# Patient Record
Sex: MALE | Race: WHITE
[De-identification: names, ages, dates, MRNs, and addresses within clinical notes are randomized per-mention and may not be internally consistent; named-entity substitution may affect disease eponyms.]

---

## 2020-03-24 ENCOUNTER — HOSPITAL ENCOUNTER (OUTPATIENT)
Dept: HOSPITAL 7 - FB.SDS | Age: 45
Discharge: HOME | End: 2020-03-24
Payer: COMMERCIAL

## 2020-03-24 DIAGNOSIS — Z79.899: ICD-10-CM

## 2020-03-24 DIAGNOSIS — Z80.0: ICD-10-CM

## 2020-03-24 DIAGNOSIS — K92.1: ICD-10-CM

## 2020-03-24 DIAGNOSIS — K21.9: ICD-10-CM

## 2020-03-24 DIAGNOSIS — K22.70: ICD-10-CM

## 2020-03-24 DIAGNOSIS — Z90.49: ICD-10-CM

## 2020-03-24 DIAGNOSIS — I10: ICD-10-CM

## 2020-03-24 DIAGNOSIS — K64.9: Primary | ICD-10-CM

## 2020-03-24 PROCEDURE — 45378 DIAGNOSTIC COLONOSCOPY: CPT

## 2020-03-24 PROCEDURE — 88313 SPECIAL STAINS GROUP 2: CPT

## 2020-03-24 PROCEDURE — 43239 EGD BIOPSY SINGLE/MULTIPLE: CPT

## 2020-03-24 PROCEDURE — 88305 TISSUE EXAM BY PATHOLOGIST: CPT

## 2020-03-24 NOTE — PCM.HPR
H & P Addendum review





- H & P Addendum Review


Date of Original H & P: 03/05/20


Date Reviewed: 03/24/20


Time Reviewed: 08:05


Patient was Examined: No Changes

## 2020-03-24 NOTE — PCM.OPNOTE
- General Post-Op/Procedure Note


Date of Surgery/Procedure: 03/24/20


Operative Procedure(s): EGD with Bx.  Colonoscopy


Findings: 





Mild Esephagitis


Normal Cscope


Pre Op Diagnosis: GERD; Rectal bleeding


Post-Op Diagnosis: Same


Anesthesia Technique: MAC


Primary Surgeon: Thomas J Mohs


Anesthesia Provider: Lore Lainez


Complications: None


Condition: Good

## 2020-03-25 NOTE — OR
DATE OF OPERATION:  03/24/2020

 

SURGEON:  Thomas J Mohs, MD

 

PREOPERATIVE DIAGNOSES:

1. Gastroesophageal reflux disease.

2. Hematochezia.

 

POSTOPERATIVE DIAGNOSES:

1. Gastroesophageal reflux disease.

2. Normal colonoscopy.

 

PROCEDURES:

1. Esophagogastroduodenoscopy with biopsy.

2. Colonoscopy.

 

ANESTHESIA:  IV sedation.

 

DESCRIPTION OF PROCEDURE:  The patient was brought to the procedure room, where

he was placed on his left side and IV sedation administered.  Oral bite block

was placed and the upper endoscope advanced into the esophagus under direct

vision without difficulty.  Vocal cords were viewed and were normal.  The scope

was advanced to the third portion of the duodenum.  The duodenum and pylorus

were normal.  Antrum and body of the stomach were normal.  Retroflexion reveals

a normal-appearing fundus.  There was no hiatal hernia.  The squamocolumnar

junction was slightly irregular with possibly one short segment of New's

esophagus.  I did biopsy this area.  I did not see any inflammation, strictures,

erosions, or other abnormalities.  Air was removed and the scope withdrawn

through the remaining esophagus, which appeared normal.  The patient tolerated

this portion of the procedure well.

 

Next, colonoscopy was performed, after digital rectal exam was done, which was

normal.  The colonoscope was inserted and advanced to the level of the cecum

without difficulty.  Cecal position was confirmed by identifying the appendiceal

lumen and ileocecal valve.  Prep was good and surfaces were well visualized.

Upon withdrawing the scope, the ascending, transverse, and descending colon were

normal in appearance.  The sigmoid colon and rectum were normal.  Retroflexion

does reveal some mildly enlarged hemorrhoidal columns, but no inflammation or

evidence of recent bleeding.  Air was removed and the scope withdrawn.  The

patient tolerated the procedure well and returned to Recovery in stable

condition.

 

RECOMMENDATIONS:  I will contact the patient with the results of his upper

endoscopy when it returns.  He should remain on his current medication.  If

New's is present, he should undergo a repeat upper endoscopy again in 3

years.  If he has recurrent hematochezia, he should see me in the clinic at the

time of the bleeding, where anoscopic exam could be performed and possible

hemorrhoid banding, if a source is identified.

 

Job#: [id number]

DD: 03/24/2020 0925

DT: 03/24/2020 1014 ERIKA/CHEPE

## 2020-10-20 ENCOUNTER — HOSPITAL ENCOUNTER (OUTPATIENT)
Dept: HOSPITAL 7 - FB.SDS | Age: 45
Discharge: HOME | End: 2020-10-20
Payer: COMMERCIAL

## 2020-10-20 DIAGNOSIS — K64.8: Primary | ICD-10-CM

## 2020-10-20 DIAGNOSIS — K64.4: ICD-10-CM

## 2020-10-20 DIAGNOSIS — K92.1: ICD-10-CM

## 2020-10-20 DIAGNOSIS — I10: ICD-10-CM

## 2020-10-20 DIAGNOSIS — D64.9: ICD-10-CM

## 2020-10-20 DIAGNOSIS — D17.1: ICD-10-CM

## 2020-10-20 NOTE — PCM.HPR
H & P Addendum review





- H & P Addendum Review


Date of Original H & P: 10/16/20


Date Reviewed: 10/20/20


Time Reviewed: 08:30


Patient was Examined: No Changes (Fells better after transfusion last week)

## 2020-10-20 NOTE — PCM.OPNOTE
- General Post-Op/Procedure Note


Date of Surgery/Procedure: 10/20/20


Operative Procedure(s): Colonoscopy.  Hemorrhoidectomy.  Skin tag removal


Pre Op Diagnosis: Hematochezia.  Skin tag


Post-Op Diagnosis: Same


Anesthesia Technique: Local, MAC


Primary Surgeon: Thomas J Mohs


EBL in mLs: 20


Complications: None


Condition: Good

## 2020-10-21 NOTE — OR
DATE OF OPERATION:  10/20/2020

 

SURGEON:  Thomas J Mohs, MD

 

PREOPERATIVE DIAGNOSES:

1. Hematochezia with anemia.

2. Skin tag, left buttock.

 

POSTOPERATIVE DIAGNOSES:

1. Enlarged internal hemorrhoids.

2. Skin tag, left buttock.

 

PROCEDURES:

1. Colonoscopy.

2. Hemorrhoidectomy.

3. Excision of skin tag, left buttock.

 

ANESTHESIA:  MAC with local.

 

DESCRIPTION OF PROCEDURE:  The patient was brought to the procedure room, where

he was placed on his left side and IV sedation administered.  Digital rectal

exam was performed, which was normal.  There was no blood on the glove.

Colonoscope was inserted and advanced to the level of the cecum without

difficulty.  Cecal position was confirmed by identifying the appendiceal lumen

and ileocecal valve.  Prep was very good and surfaces were well visualized.

Upon withdrawing the scope, the ascending, transverse, and descending colon were

normal in appearance.  The sigmoid colon and rectum were normal.  Retroflexion

reveals enlarged internal hemorrhoids, but no active bleeding.  Air was removed

and the scope withdrawn.  The patient tolerated the procedure well and returned

to recovery in stable condition.

 

The patient was then placed on the operating room table in the prone jackknife

position.  Buttocks were prepped with benzoin and retracted with tape.  Betadine

prep was performed.  A 50:50 mixture of 1% Lidocaine with Epinephrine and 0.5%

Marcaine was used for local anesthesia.  Perianal region and sphincter were

injected with local anesthetic.  Anoscopic exam reveals enlarged internal

hemorrhoids, mostly in the left lateral and left posterior position, which was

combined as 1 large hemorrhoid.  He also has a medium-size one in the right

lateral position.  The left one was done first by suture ligating the base with

3-0 Vicryl and sharply excising the hemorrhoid complex.  This was closed with

running locking 3-0 Vicryl.  A few interrupted sutures were used to reinforce

the line where any oozing was visible.  After hemostasis was assured, the right

one was done in a similar fashion in the right lateral position.  This suture

line was also reinforced and hemostasis was assured.  Final inspection was then

given and there is some mildly enlarged internal hemorrhoidal tissue in the

anterior midline.  This was oversewn with 3-0 Vicryl since it was close to the

other suture line and did not want to excise it.

 

Lastly, the skin tag was excised by using electrocautery at its base, which was

approximately 4 mm in diameter.  Hemostasis was assured.  Sterile bulky pressure

dressing was applied.  The patient tolerated the procedure well.  Estimated

blood loss 20 mL.  He returned to postanesthesia in stable condition.

 

Job#: [id number]

DD: 10/20/2020 1039

DT: 10/20/2020 1513 ERIKA/CHEPE